# Patient Record
Sex: MALE | Race: WHITE
[De-identification: names, ages, dates, MRNs, and addresses within clinical notes are randomized per-mention and may not be internally consistent; named-entity substitution may affect disease eponyms.]

---

## 2019-01-17 ENCOUNTER — HOSPITAL ENCOUNTER (INPATIENT)
Dept: HOSPITAL 96 - M.ERS | Age: 44
LOS: 4 days | Discharge: HOME | DRG: 388 | End: 2019-01-21
Attending: INTERNAL MEDICINE | Admitting: INTERNAL MEDICINE
Payer: MEDICARE

## 2019-01-17 VITALS — SYSTOLIC BLOOD PRESSURE: 144 MMHG | DIASTOLIC BLOOD PRESSURE: 96 MMHG

## 2019-01-17 VITALS — DIASTOLIC BLOOD PRESSURE: 94 MMHG | SYSTOLIC BLOOD PRESSURE: 140 MMHG

## 2019-01-17 VITALS — WEIGHT: 231 LBS | BODY MASS INDEX: 34.21 KG/M2 | HEIGHT: 69.02 IN

## 2019-01-17 VITALS — DIASTOLIC BLOOD PRESSURE: 96 MMHG | SYSTOLIC BLOOD PRESSURE: 135 MMHG

## 2019-01-17 VITALS — DIASTOLIC BLOOD PRESSURE: 91 MMHG | SYSTOLIC BLOOD PRESSURE: 141 MMHG

## 2019-01-17 VITALS — DIASTOLIC BLOOD PRESSURE: 85 MMHG | SYSTOLIC BLOOD PRESSURE: 138 MMHG

## 2019-01-17 VITALS — SYSTOLIC BLOOD PRESSURE: 142 MMHG | DIASTOLIC BLOOD PRESSURE: 100 MMHG

## 2019-01-17 DIAGNOSIS — H35.52: ICD-10-CM

## 2019-01-17 DIAGNOSIS — Z90.49: ICD-10-CM

## 2019-01-17 DIAGNOSIS — H91.90: ICD-10-CM

## 2019-01-17 DIAGNOSIS — Q16.5: ICD-10-CM

## 2019-01-17 DIAGNOSIS — Z82.49: ICD-10-CM

## 2019-01-17 DIAGNOSIS — Z88.5: ICD-10-CM

## 2019-01-17 DIAGNOSIS — R65.11: ICD-10-CM

## 2019-01-17 DIAGNOSIS — N18.1: ICD-10-CM

## 2019-01-17 DIAGNOSIS — N17.9: ICD-10-CM

## 2019-01-17 DIAGNOSIS — Z79.899: ICD-10-CM

## 2019-01-17 DIAGNOSIS — K56.600: Primary | ICD-10-CM

## 2019-01-17 DIAGNOSIS — R00.1: ICD-10-CM

## 2019-01-17 DIAGNOSIS — Q87.89: ICD-10-CM

## 2019-01-17 DIAGNOSIS — E83.42: ICD-10-CM

## 2019-01-17 DIAGNOSIS — E87.6: ICD-10-CM

## 2019-01-17 DIAGNOSIS — H54.8: ICD-10-CM

## 2019-01-17 LAB
ABSOLUTE MONOCYTES: 0.4 THOU/UL (ref 0–1.2)
ALBUMIN SERPL-MCNC: 4.2 G/DL (ref 3.4–5)
ALP SERPL-CCNC: 122 U/L (ref 46–116)
ALT SERPL-CCNC: 46 U/L (ref 30–65)
ANION GAP SERPL CALC-SCNC: 10 MMOL/L (ref 7–16)
AST SERPL-CCNC: 22 U/L (ref 15–37)
BILIRUB SERPL-MCNC: 0.8 MG/DL
BILIRUB UR-MCNC: NEGATIVE MG/DL
BUN SERPL-MCNC: 13 MG/DL (ref 7–18)
CALCIUM SERPL-MCNC: 9 MG/DL (ref 8.5–10.1)
CHLORIDE SERPL-SCNC: 108 MMOL/L (ref 98–107)
CO2 SERPL-SCNC: 24 MMOL/L (ref 21–32)
COLOR UR: YELLOW
CREAT SERPL-MCNC: 1.3 MG/DL (ref 0.6–1.3)
GLUCOSE SERPL-MCNC: 153 MG/DL (ref 70–99)
GRANULOCYTES NFR BLD MANUAL: 90 %
HCT VFR BLD CALC: 49.8 % (ref 42–52)
HGB BLD-MCNC: 17.2 GM/DL (ref 14–18)
KETONES UR STRIP-MCNC: (no result) MG/DL
LIPASE: 88 U/L (ref 73–393)
LYMPHOCYTES # BLD: 0.7 THOU/UL (ref 0.8–5.3)
LYMPHOCYTES NFR BLD AUTO: 5 %
MCH RBC QN AUTO: 32.6 PG (ref 26–34)
MCHC RBC AUTO-ENTMCNC: 34.5 G/DL (ref 28–37)
MCV RBC: 94.6 FL (ref 80–100)
MONOCYTES NFR BLD: 3 %
MPV: 7.8 FL. (ref 7.2–11.1)
NEUTROPHILS # BLD: 12.9 THOU/UL (ref 1.6–8.1)
NEUTS BAND NFR BLD: 2 %
NUCLEATED RBCS: 0 /100WBC
PLATELET # BLD EST: ADEQUATE 10*3/UL
PLATELET COUNT*: 224 THOU/UL (ref 150–400)
POTASSIUM SERPL-SCNC: 3.8 MMOL/L (ref 3.5–5.1)
PROT SERPL-MCNC: 7.8 G/DL (ref 6.4–8.2)
PROT UR QL STRIP: (no result)
RBC # BLD AUTO: 5.27 MIL/UL (ref 4.5–6)
RBC # UR STRIP: (no result) /UL
RBC MORPH BLD: NORMAL
RDW-CV: 13.9 % (ref 10.5–14.5)
SODIUM SERPL-SCNC: 142 MMOL/L (ref 136–145)
SP GR UR STRIP: 1.02 (ref 1–1.03)
URINE CLARITY: CLEAR
URINE GLUCOSE-RANDOM: NEGATIVE
URINE LEUKOCYTES-REFLEX: NEGATIVE
URINE NITRITE-REFLEX: NEGATIVE
UROBILINOGEN UR STRIP-ACNC: 0.2 E.U./DL (ref 0.2–1)
WBC # BLD AUTO: 14 THOU/UL (ref 4–11)

## 2019-01-17 NOTE — NUR
SPOKE WITH PT.AND FATHER. PT.UP IN CHAIR. KEPT DOZING OFF DURING CONVERSATION.
PT.IS BOTH DEAF AND BLIND. HE CAN HEAR A LITTLE OUT OF HIS R EAR AND WEARS A
HEARING AID. HE LIVES ALONE. FATHER SAID HE HAS SINCE AGE 18. PT.  BEGAN TO
LOSE SIGHT AT AGE 8 AND WAS BORN HEARING IMPAIRED. HE IS VERY INDEPENDENT.
DOES BASICALLY EVERYHTING BUT DRIVE. HIS PARENTS TAKE HIM SHOPPING. HE HAS AN
I PHONE THAT IS BLUE TOOTH COMPATIBLE WITH A DEVICE HE WEARS ON HIS WAIST.
DARA COMES ACROSS IT TO READ HIS MESSAGES AND IT HAS KEY BOARD FOR HIM TO
TYPE HIS RESPONSE. HE GIVES MOTIVATIONAL TALKS ABOUT HIS DISABILITIES AND HOW
HE CAN DO ANYTHING SO OTHERS CAN SO TOO IF THEY SET THEIR MINDS TO IT. HE
CALLS THE BAD THINGS THAT HAVE HAPPENED TO HIM IN LIFE- SHARKS. HIS TALKS ARE
CALLED SWIMMING WITH THE SHARKS. HE DOES SIGN LANGUAGE AND HAS AN 
TO DO THE TALKNG FOR HIS TALKS.  HE HAS RECENTLY BECAME THE FIRST PERSON
QUALIFIED FOR ASSISTANCE BEING BLIND AND DEAF. BEFORE IF YOU WERE BLIND AND
DEAF, YOU HAD TO HAVE A MENTAL DISABLITY ALSO. HE SHOULD BE ABLE TO GET
ASSISTANCE OF AN AIDE TO ASSIST HIM WITH ERRANDS,APPTS.,ETC. FATHER COULD NOT
THINK OF ANYTHING CM COULD PROVIDE TO ASSIST PT. HE SAID PTS MOM WILL BE
BRINGING IN HIS PHONE TODAY. CM WILL FOLLOW.

## 2019-01-17 NOTE — NUR
PATIENT ADMITTED TO ROOM 108 ON UNIT AT APPROXIMATELY 0515. PATIENTS MOTHER AT
BEDSIDE HELPING TO ANSWER QUESTIONS FOR ADMISSION. PATIENT HAS EXPRESSIVE
APHASIA AND HAS DIFFICULTY WITH COMMUNICATION. PATIENTS MOTHER STATED THAT
PATIENT USES BRAIL AND TACTILE SIGN LANGUAGE TO COMMUNICATE. PATIENT AND
MOTHER WERE ORIENTED TO ROOM AND STAFF. FALL EDUCATION GIVEN AND FALL
AGREEMENT SIGNED. ADMISSION AND ASSESSMENT AS CHARTED. VSS ON RA. PAIN
CONTROLLED AT THIS TIME D/T RECEIVING MEDICATION IN ER. PATIENT HAS HAD SOME
SLIGHT NAUSEA BUT NO EMESIS. PATIENT URINATING USING URINAL AND ALSO ASSISTED
X 1 TO THE BATHROOM. PATIENT IS NPO AT THIS TIME AND SURGERY HAS BEEN
CONSULTED IN ER. IV IN RIGHT AC-NS @ 100ML/HR. COTTON BALL TAPED TO CALL LIGHT
BUTTON TO ASSIST WHEN NEEDING TO USE CALL LIGHT. FALL PRECAUTIONS IN PLACE AND
HOURLY ROUNDS MADE. WILL CONTINUE WITH PLAN OF CARE AND NURSING TO MONITOR.

## 2019-01-17 NOTE — NUR
PATIENT REMAINED ALERT AND ORIENTED X'S 4. VITAL SIGNS AND SPO2 STABLE. IV
CLEAN, FLUIDS INFUSING. PAIN WELL CONTROLLED WITH PAIN MEDS. HAD BOUTS OF
NAUSEA, ZOFRAN DID NOT WORKING, GOT IV PHENERGRAN, NAUSEA SUBSIDED, HAS NOT
HAD A BOUT OF NAUSEA SINCE PHENERGREN GIVEN. HAVING HICCUPS. VOIDED WITHOUT
ISSUE. COMPLETED HOURLY ROUDNING. CALL LIGHT WITHIN REACH.

## 2019-01-18 VITALS — SYSTOLIC BLOOD PRESSURE: 116 MMHG | DIASTOLIC BLOOD PRESSURE: 61 MMHG

## 2019-01-18 VITALS — DIASTOLIC BLOOD PRESSURE: 82 MMHG | SYSTOLIC BLOOD PRESSURE: 127 MMHG

## 2019-01-18 VITALS — SYSTOLIC BLOOD PRESSURE: 120 MMHG | DIASTOLIC BLOOD PRESSURE: 80 MMHG

## 2019-01-18 VITALS — SYSTOLIC BLOOD PRESSURE: 151 MMHG | DIASTOLIC BLOOD PRESSURE: 103 MMHG

## 2019-01-18 VITALS — DIASTOLIC BLOOD PRESSURE: 70 MMHG | SYSTOLIC BLOOD PRESSURE: 115 MMHG

## 2019-01-18 LAB
ANION GAP SERPL CALC-SCNC: 6 MMOL/L (ref 7–16)
BUN SERPL-MCNC: 17 MG/DL (ref 7–18)
CALCIUM SERPL-MCNC: 8.5 MG/DL (ref 8.5–10.1)
CHLORIDE SERPL-SCNC: 108 MMOL/L (ref 98–107)
CO2 SERPL-SCNC: 28 MMOL/L (ref 21–32)
CREAT SERPL-MCNC: 1.3 MG/DL (ref 0.6–1.3)
GLUCOSE SERPL-MCNC: 138 MG/DL (ref 70–99)
MAGNESIUM SERPL-MCNC: 1.7 MG/DL (ref 1.8–2.4)
PHOSPHATE SERPL-MCNC: 2.9 MG/DL (ref 2.5–4.9)
POTASSIUM SERPL-SCNC: 3.5 MMOL/L (ref 3.5–5.1)
SODIUM SERPL-SCNC: 142 MMOL/L (ref 136–145)

## 2019-01-18 PROCEDURE — 0D9670Z DRAINAGE OF STOMACH WITH DRAINAGE DEVICE, VIA NATURAL OR ARTIFICIAL OPENING: ICD-10-PCS | Performed by: RADIOLOGY

## 2019-01-18 NOTE — NUR
Oriented x 4 but blind and deaf. He has abdominal pain that he rates a 10 lexus
times. His mom is at the bedside and helps with his communication. He does
have some hearing in his right ear and he wears a hearing aide. He's had
fentanyl 100 mcg x 3 this shift for the abdominal pain. His abdomen is
distended and is hyperactive/tympanic. He is passing gas this shift and has
voided large amounts. Mom said he had diarrhea on Wednesday. He states pain is
more on his rt side and his ribs. BP has been elevated r/t pain. Roomair sat
has been 91-94%. Nausea x 1. He is sleeping in recliner this shift.

## 2019-01-18 NOTE — NUR
ASSUMED CARE OF PATIENT AT APPROX 0730. ALERT AND ORIENTED X4. ASSESSMENT
COMPLETED AND AS CHARTED. VSS ON ROOM AIR. PATIENT HAD COMPLAINTS OF ABDOMINAL
PAIN EARLY IN THE SHIFT, GAVE IV PAIN MEDICATION WITH PARTIAL RELIEF. NG TUBE
ORDERED AND PLACED, SET ON LOW INTERMITTENT SUCTION WITH 625 OUTPUT BY END OF
SHIFT. PATIENT STATES THAT HIS ABDOMINAL PAIN IS BETTER NOW. FLUIDS INFUSED AS
ORDERED. MAG INFUSING AS ORDERED. FALL PRECAUTIONS IN PLACE. HOURLY ROUNDS
COMPLETED. NURSING WILL CONTINUE TO MONITOR.

## 2019-01-19 VITALS — SYSTOLIC BLOOD PRESSURE: 134 MMHG | DIASTOLIC BLOOD PRESSURE: 82 MMHG

## 2019-01-19 VITALS — SYSTOLIC BLOOD PRESSURE: 123 MMHG | DIASTOLIC BLOOD PRESSURE: 84 MMHG

## 2019-01-19 LAB
ABSOLUTE BASOPHILS: 0.1 THOU/UL (ref 0–0.2)
ABSOLUTE EOSINOPHILS: 0.1 THOU/UL (ref 0–0.7)
ABSOLUTE MONOCYTES: 1 THOU/UL (ref 0–1.2)
ANION GAP SERPL CALC-SCNC: 8 MMOL/L (ref 7–16)
BASOPHILS NFR BLD AUTO: 0.7 %
BUN SERPL-MCNC: 16 MG/DL (ref 7–18)
CALCIUM SERPL-MCNC: 8.7 MG/DL (ref 8.5–10.1)
CHLORIDE SERPL-SCNC: 110 MMOL/L (ref 98–107)
CO2 SERPL-SCNC: 30 MMOL/L (ref 21–32)
CREAT SERPL-MCNC: 1.4 MG/DL (ref 0.6–1.3)
EOSINOPHIL NFR BLD: 0.7 %
GLUCOSE SERPL-MCNC: 117 MG/DL (ref 70–99)
GRANULOCYTES NFR BLD MANUAL: 78.2 %
HCT VFR BLD CALC: 49.9 % (ref 42–52)
HGB BLD-MCNC: 16.9 GM/DL (ref 14–18)
LYMPHOCYTES # BLD: 1.8 THOU/UL (ref 0.8–5.3)
LYMPHOCYTES NFR BLD AUTO: 13.1 %
MCH RBC QN AUTO: 32.2 PG (ref 26–34)
MCHC RBC AUTO-ENTMCNC: 33.8 G/DL (ref 28–37)
MCV RBC: 95.1 FL (ref 80–100)
MONOCYTES NFR BLD: 7.3 %
MPV: 8.2 FL. (ref 7.2–11.1)
NEUTROPHILS # BLD: 10.8 THOU/UL (ref 1.6–8.1)
NUCLEATED RBCS: 0 /100WBC
PLATELET COUNT*: 184 THOU/UL (ref 150–400)
POTASSIUM SERPL-SCNC: 3.5 MMOL/L (ref 3.5–5.1)
RBC # BLD AUTO: 5.24 MIL/UL (ref 4.5–6)
RDW-CV: 14 % (ref 10.5–14.5)
SODIUM SERPL-SCNC: 148 MMOL/L (ref 136–145)
WBC # BLD AUTO: 13.8 THOU/UL (ref 4–11)

## 2019-01-19 NOTE — NUR
PATIENT REMAINED ALERT AND ORIENTED X'S 4. VITAL SIGNS AND SPO2 STABLE. IV
CLEAN, FLUIDS INFUSING. NG CLAMPED, NO NAUSEA OR VOMITING. TOLERATED DIET.
PATIENT GIVEN ENEMA, VERY SMALL AMOUNT OF STOOL OUT. ABDOMEN MUCH
SOFTER AND LESS DISTENEDED THAN PREVIOUSLY. PATIENT GIVEN BED BATH. PAIN WELL
CONTROLLED WITH PAIN MEDS. ALL MORNING MEDS SKIPPED DUE TO PATIENT BEING NPO.
COMPLETED HOURLY ROUNDING. CALL LIGHT WITHIN REACH. WILL CONTINUE TO MONITOR.

## 2019-01-19 NOTE — NUR
ASSUMED PATIENT CARE AT 1900.  PATIENT ALERT AND ORIENTED TIMES FOUR.  NG IN
PLACE TO CONTINUOUS SUCTION.  1400 OUT SINCE TUBE PUT IN.  PATIENT HAD A SMALL
SEMI-FORMED BM THIS AM.  IV REMAINS PATENT.  MINOR DISCOMFORT NOTED IN
ABDOMINAL AREA.  HEATING PAD IN PLACE.  MOTHER AT BEDSIDE THROUGH THE NIGHT.
HELPS STAFF WITH INTERPRETING PATIENTS SPEECH AS HE IS MOSTLY DEAF AND DOES
NOT ALWAYS FORM WORDS PROPERLY.  HOURLY ROUNDING AND RN ASSESSMENT COMPLETED
AS DOCUMENTED

## 2019-01-20 VITALS — SYSTOLIC BLOOD PRESSURE: 124 MMHG | DIASTOLIC BLOOD PRESSURE: 74 MMHG

## 2019-01-20 VITALS — SYSTOLIC BLOOD PRESSURE: 143 MMHG | DIASTOLIC BLOOD PRESSURE: 72 MMHG

## 2019-01-20 LAB
ANION GAP SERPL CALC-SCNC: 5 MMOL/L (ref 7–16)
BUN SERPL-MCNC: 14 MG/DL (ref 7–18)
CALCIUM SERPL-MCNC: 7.7 MG/DL (ref 8.5–10.1)
CHLORIDE SERPL-SCNC: 111 MMOL/L (ref 98–107)
CO2 SERPL-SCNC: 30 MMOL/L (ref 21–32)
CREAT SERPL-MCNC: 1.2 MG/DL (ref 0.6–1.3)
GLUCOSE SERPL-MCNC: 99 MG/DL (ref 70–99)
HCT VFR BLD CALC: 42.5 % (ref 42–52)
HGB BLD-MCNC: 14.5 GM/DL (ref 14–18)
MCH RBC QN AUTO: 32.7 PG (ref 26–34)
MCHC RBC AUTO-ENTMCNC: 34.1 G/DL (ref 28–37)
MCV RBC: 95.8 FL (ref 80–100)
MPV: 8.1 FL. (ref 7.2–11.1)
PLATELET COUNT*: 150 THOU/UL (ref 150–400)
POTASSIUM SERPL-SCNC: 3.2 MMOL/L (ref 3.5–5.1)
RBC # BLD AUTO: 4.44 MIL/UL (ref 4.5–6)
RDW-CV: 13.9 % (ref 10.5–14.5)
SODIUM SERPL-SCNC: 146 MMOL/L (ref 136–145)
WBC # BLD AUTO: 8.9 THOU/UL (ref 4–11)

## 2019-01-20 NOTE — NUR
ASSUMED CARE OF PATEINT AT APPROX 1930. ALERT AND OREINTED X4. ASSESSMENT
COMPLETED AND AS CHARTED. VSS ON ROOM AIR. NO COMPLAINTS OF PAIN, NAUSEA, OR
SOA. NG REMAINS IN PLACE AND CLAMPED. FLUIDS INFUSED AS ORDERED. TOLERATING
CLEAR LIQUID DIET. UP WIHT SBA TO BEDSIDE COMMODE. SLEPT COMFORTABLY
THROUGHOUT THE NIGHT. FALL PRECAUTIONS IN PLACE. CALL LIGHT WITHIN REACH AND
USES APPROPRIATELY. FAMILY AT BEDSIDE THROUGHOUT THE NIGHT. HOURLY ROUNDS
COMPLETED. NURSING WILL CONTINUE TO MONITOR.

## 2019-01-20 NOTE — NUR
PATIENT REMAINED ALERT AND ORIENTED X'S 4. VITAL SIGNS AND SPO2 STABLE. NG
TUBE REMOVED. TOLERATED DIET, NO NAUSEA AND VOMITING. PATIENT DRINKING LOTS OF
WATER WITHOUT ISSUE. VOIDED AND PASSED BM. K+ WAS LOW, GAVE REPLACEMENT, K+
NOW AT 3.4. GAVE ONE MORE ROUND OF REPLACEMENT BUT HAS NOT HAD A LAB REDRAW
YET. PATIENT HAS DENIED PAIN THROUGHOUT SHIFT. COMPLETED HOURLY ROUNDING. CALL
LIGHT WITHIN REACH. WILL CONTINUE TO MONITOR.

## 2019-01-21 VITALS — DIASTOLIC BLOOD PRESSURE: 84 MMHG | SYSTOLIC BLOOD PRESSURE: 136 MMHG

## 2019-01-21 VITALS — SYSTOLIC BLOOD PRESSURE: 136 MMHG | DIASTOLIC BLOOD PRESSURE: 84 MMHG

## 2019-01-21 NOTE — NUR
PT WAS SEEN BY CARDIOLOGY AND APPROVED DISCHARGE. DISCHARGE INSTRUCTIONS GIVEN
TO PATIENT AND PARENTS AT BEDSIDE. IV REMOVED. PT NOTED TO HAVE BOWEL
MOVEMENT TODAY. PT LEFT BY WHEEL CHAIR WITH PERSONAL BELONGINGS TO LEAVE WITH
PARENTS BY PRIVATE CAR @ 0705.

## 2019-01-21 NOTE — NUR
PT ALERT AND ORIENTED. MOTHER @ BEDSIDE. HR BRADYCARDIC @ 42.  COZAAR,
METOPROLOL, AND LOSARTIN HELD. PHYSICIAN NOTIFIED. RECEIVED ORDERS TO GIVE
LOSARTIN. DENIES NAUSEA AND PAIN. NURSING TO CONTINUE TO MONITOR.

## 2019-01-21 NOTE — EKG
[place] [address]
Phone:  [phone]                     ELECTROCARDIOGRAM REPORT      
_______________________________________________________________________________
 
Name:       VINCE BOOKER              Room:           [address]    ADM IN  
.R.#:  [id number]      Account #:      [id number]  
Admission:  19     Attend Phys:    Kiki Forde MD 
Discharge:               Date of Birth:  [birth year]  
         Report #: [id number]
    [id number]
_______________________________________________________________________________
THIS REPORT FOR:  //name//                      
 
                          [facility]
                                       
Test Date:    2019               Test Time:    09:34:57
Pat Name:     VINCE BOOKER          Department:   
Patient ID:   [id number]            Room:         [address]
Gender:       M                        Technician:   
:          1975               Requested By: Bella Villafana
Order Number: [id number]    Celina MD:   Tone Chairez
                                 Measurements
Intervals                              Axis          
Rate:         39                       P:            -2
SC:           152                      QRS:          74
QRSD:         96                       T:            85
QT:           479                                    
QTc:          386                                    
                           Interpretive Statements
Sinus bradycardia
Low voltage, extremity leads
No previous ECG available for comparison
 
Electronically Signed On 2019 15:47:23 CST by Tone Chairez
[email or web address]
 
 
 
 
 
 
 
 
 
 
 
 
 
 
 
 
 
 
 
  <ELECTRONICALLY SIGNED>
                                           By: Tone Chairez MD, [facility]      
  19     1547
D: 19 0934   _____________________________________
T: 19   Tone Chairez MD, FACC        /EPI

## 2019-01-21 NOTE — NUR
ASSUMED CARE OF PATIENT AT APPROX 1930. ALERT AND ORIENTED X4. ASSESSMENT
COMPLETED AND AS CHARTED. NO COMPLAINTS OF PAIN, NAUSEA, OR SOA. PATEINT
TOLERATING LIQUID DIET, HAVING STOOLS AND PASSING GAS. PATIENT UP SBA TO
COMMODE. OBSERVED SLEEPING COMFORTABLY IN THE CHAIR ON HOURLY ROUNDS. CALL
IGHT WITHIN REACH AND FAMILY ASSISTS WITH CALLING FOR NEEDS. FALL PRECAUTIONS
IN PLACE. NURSING WILL CONTINUE TO MONITOR.

## 2019-01-22 NOTE — CON
[address]  [address]                    CONSULTATION                  
_______________________________________________________________________________
 
Name:       VINCE BOOKER              Room:           [address] [place]  
M.R.#:  [id number]      Account #:      [id number]  
Admission:  01/17/19     Attend Phys:    Kiki Forde MD 
Discharge:  01/21/19     Date of Birth:  [birth year]  
         Report #: [id number]
                                                                     [id number]  
_______________________________________________________________________________
THIS REPORT FOR:  //name//                      
 
CC: UMM Forde
    Physician staff
 
DATE OF SERVICE:  01/21/2019
 
 
HISTORY OF PRESENT ILLNESS:  The patient is a 43-year-old white male who I was
asked to see in the hospital today after he was noted to be bradycardic.  The
history is obtained from the chart as well as the patient's father, who is
present.  The patient apparently was born deaf, but does speak.  He is able to
ambulate.  He actually wrestled in high school.  He has been blind since he was
8 years old.  He has multiple medical issues.  He is able to ambulate with a
cane.  He lives by himself.  He is actually a .  He
presented here to the Emergency Room 5 days ago with nausea and vomiting and
abdominal pain.  He was felt to be having evidence of an acute abdomen.  He was
found to have evidence of partial small-bowel obstruction.  The symptoms
resolved.  He is now being advanced to full liquids.  He was noted to be
bradycardic.  Cardiology consultation was requested.  According to the father,
there has been no history of heart problems.  He denies being told he had a
heart murmur or slow heart rate in the past.  He has had no recent
lightheadedness, shortness of breath, syncope.
 
PAST MEDICAL HISTORY:  He has had previous cholecystectomy, back surgery,
cataract extraction.  Apparently, in [place] in the past he had pneumonia and
had a lung biopsy for possible lung cancer that turned out not to be cancer.  He
does have a history of high blood pressure.  No history of diabetes.
 
MEDICATIONS ON ADMISSION:  Included metoprolol, losartan, aspirin.
 
ALLERGIES:  HE HAS AN INTOLERANCE TO MORPHINE.
 
FAMILY HISTORY:  His father had coronary artery bypass surgery.
 
SOCIAL HISTORY:  He is single.  Lives in [place], [place].  No smoking or
alcohol abuse.
 
REVIEW OF SYSTEMS:  He has no history of stroke, asthma, peptic ulcer disease,
liver disease.  He had chronic kidney disease, no cancer.  No psychiatric
illness.  No chronic skin condition.
 
PHYSICAL EXAMINATION:
GENERAL:  Revealed a young white male lying in bed.  He appeared in no distress.
VITAL SIGNS:  Currently, has a blood pressure 130/80, pulse is 50.  He is
 
 
 
[place] [address]                    CONSULTATION                  
_______________________________________________________________________________
 
Name:       VINCE BOOKRE              Room:           [address]#:  [id number]      Account #:      [id number]  
Admission:  01/17/19     Attend Phys:    Kiki Forde MD 
Discharge:  01/21/19     Date of Birth:  [birth year]  
         Report #: [id number]
                                                                     [id number]  
_______________________________________________________________________________
afebrile.
HEENT:  He is anicteric.  Conjunctivae pink.  Mucous membranes moist.
NECK:  Veins do not appear distended.  No carotid bruits.  Neck supple.
CHEST:  Clear to auscultation.
CARDIAC:  Regular bradycardia, no significant murmur.
ABDOMEN:  Soft.
EXTREMITIES:  No edema.
SKIN:  Warm and dry.
NEUROLOGIC:  Nonfocal.
 
LABORATORY DATA:  His ECG on admission from 01/21/2019 showed severe sinus
bradycardia at 40 beats per minute, low voltage, no significant ST or T-wave
changes.  His workup so far, he actually had lab work, sodium 146, creatinine
1.2, potassium 3.4, on admission potassium was 3.7.  Liver function studies: 
Alkaline phosphatase 122.  White blood cell count 8.9, hemoglobin 14.5.
 
IMPRESSION AND RECOMMENDATIONS:
1.  Sinus bradycardia.  Asymptomatic.  I would not treat.  At this time, I would
discontinue metoprolol and continue losartan by itself for hypertension.  I
would consider checking thyroid function studies.
2.  Legally blind.
3.  Deaf.
4.  Partial small-bowel obstruction.  Appears to have resolved.
 
 
 
 
 
 
 
 
 
 
 
 
 
 
 
 
 
 
 
 
 
<ELECTRONICALLY SIGNED>
                                        By:  Tone Chairez MD, FACC      
01/22/19     0809
D: 01/21/19 1444_______________________________________
T: 01/21/19 1754Davigarrett Chairez MD, FACC         /nt

## 2020-10-04 ENCOUNTER — HOSPITAL ENCOUNTER (INPATIENT)
Dept: HOSPITAL 96 - M.ERS | Age: 45
LOS: 3 days | Discharge: HOME | DRG: 871 | End: 2020-10-07
Attending: INTERNAL MEDICINE | Admitting: INTERNAL MEDICINE
Payer: MEDICARE

## 2020-10-04 VITALS — SYSTOLIC BLOOD PRESSURE: 146 MMHG | DIASTOLIC BLOOD PRESSURE: 91 MMHG

## 2020-10-04 VITALS — SYSTOLIC BLOOD PRESSURE: 145 MMHG | DIASTOLIC BLOOD PRESSURE: 88 MMHG

## 2020-10-04 VITALS — DIASTOLIC BLOOD PRESSURE: 97 MMHG | SYSTOLIC BLOOD PRESSURE: 141 MMHG

## 2020-10-04 VITALS — BODY MASS INDEX: 33.92 KG/M2 | HEIGHT: 69.02 IN | WEIGHT: 229 LBS

## 2020-10-04 VITALS — DIASTOLIC BLOOD PRESSURE: 97 MMHG | SYSTOLIC BLOOD PRESSURE: 142 MMHG

## 2020-10-04 DIAGNOSIS — K21.9: ICD-10-CM

## 2020-10-04 DIAGNOSIS — K52.9: ICD-10-CM

## 2020-10-04 DIAGNOSIS — Z28.21: ICD-10-CM

## 2020-10-04 DIAGNOSIS — Z20.828: ICD-10-CM

## 2020-10-04 DIAGNOSIS — I12.9: ICD-10-CM

## 2020-10-04 DIAGNOSIS — Q16.5: ICD-10-CM

## 2020-10-04 DIAGNOSIS — A41.9: Primary | ICD-10-CM

## 2020-10-04 DIAGNOSIS — N18.30: ICD-10-CM

## 2020-10-04 DIAGNOSIS — Z90.49: ICD-10-CM

## 2020-10-04 DIAGNOSIS — K56.609: ICD-10-CM

## 2020-10-04 DIAGNOSIS — N17.0: ICD-10-CM

## 2020-10-04 DIAGNOSIS — Z88.5: ICD-10-CM

## 2020-10-04 DIAGNOSIS — Z88.8: ICD-10-CM

## 2020-10-04 DIAGNOSIS — M81.0: ICD-10-CM

## 2020-10-04 DIAGNOSIS — Q87.89: ICD-10-CM

## 2020-10-04 DIAGNOSIS — Z79.899: ICD-10-CM

## 2020-10-04 DIAGNOSIS — M10.9: ICD-10-CM

## 2020-10-04 DIAGNOSIS — E86.0: ICD-10-CM

## 2020-10-04 LAB
ABSOLUTE MONOCYTES: 0.3 THOU/UL (ref 0–1.2)
ALBUMIN SERPL-MCNC: 4.4 G/DL (ref 3.4–5)
ALP SERPL-CCNC: 159 U/L (ref 46–116)
ALT SERPL-CCNC: 41 U/L (ref 30–65)
ANION GAP SERPL CALC-SCNC: 8 MMOL/L (ref 7–16)
AST SERPL-CCNC: 31 U/L (ref 15–37)
BILIRUB SERPL-MCNC: 0.8 MG/DL
BILIRUB UR-MCNC: NEGATIVE MG/DL
BUN SERPL-MCNC: 14 MG/DL (ref 7–18)
CALCIUM SERPL-MCNC: 9.4 MG/DL (ref 8.5–10.1)
CHLORIDE SERPL-SCNC: 104 MMOL/L (ref 98–107)
CO2 SERPL-SCNC: 29 MMOL/L (ref 21–32)
COLOR UR: YELLOW
CREAT SERPL-MCNC: 1.4 MG/DL (ref 0.6–1.3)
GLUCOSE SERPL-MCNC: 127 MG/DL (ref 70–99)
GRANULOCYTES NFR BLD MANUAL: 89 %
HCT VFR BLD CALC: 53.9 % (ref 42–52)
HGB BLD-MCNC: 18.5 GM/DL (ref 14–18)
KETONES UR STRIP-MCNC: (no result) MG/DL
LIPASE: 93 U/L (ref 73–393)
LYMPHOCYTES # BLD: 0.9 THOU/UL (ref 0.8–5.3)
LYMPHOCYTES NFR BLD AUTO: 5 %
MCH RBC QN AUTO: 31.9 PG (ref 26–34)
MCHC RBC AUTO-ENTMCNC: 34.3 G/DL (ref 28–37)
MCV RBC: 92.9 FL (ref 80–100)
MONOCYTES NFR BLD: 2 %
MPV: 8.6 FL. (ref 7.2–11.1)
NEUTROPHILS # BLD: 16.1 THOU/UL (ref 1.6–8.1)
NEUTS BAND NFR BLD: 4 %
NUCLEATED RBCS: 0 /100WBC
PLATELET # BLD EST: ADEQUATE 10*3/UL
PLATELET COUNT*: 213 THOU/UL (ref 150–400)
POTASSIUM SERPL-SCNC: 4.6 MMOL/L (ref 3.5–5.1)
PROT SERPL-MCNC: 8.2 G/DL (ref 6.4–8.2)
PROT UR QL STRIP: (no result)
RBC # BLD AUTO: 5.81 MIL/UL (ref 4.5–6)
RBC # UR STRIP: (no result) /UL
RBC MORPH BLD: NORMAL
RDW-CV: 14.3 % (ref 10.5–14.5)
SODIUM SERPL-SCNC: 141 MMOL/L (ref 136–145)
SP GR UR STRIP: 1.02 (ref 1–1.03)
URINE CLARITY: CLEAR
URINE GLUCOSE-RANDOM: NEGATIVE
URINE LEUKOCYTES-REFLEX: NEGATIVE
URINE NITRITE-REFLEX: NEGATIVE
UROBILINOGEN UR STRIP-ACNC: 0.2 E.U./DL (ref 0.2–1)
WBC # BLD AUTO: 17.3 THOU/UL (ref 4–11)

## 2020-10-05 VITALS — SYSTOLIC BLOOD PRESSURE: 130 MMHG | DIASTOLIC BLOOD PRESSURE: 89 MMHG

## 2020-10-05 VITALS — DIASTOLIC BLOOD PRESSURE: 84 MMHG | SYSTOLIC BLOOD PRESSURE: 143 MMHG

## 2020-10-05 VITALS — SYSTOLIC BLOOD PRESSURE: 131 MMHG | DIASTOLIC BLOOD PRESSURE: 76 MMHG

## 2020-10-05 VITALS — SYSTOLIC BLOOD PRESSURE: 127 MMHG | DIASTOLIC BLOOD PRESSURE: 83 MMHG

## 2020-10-05 VITALS — DIASTOLIC BLOOD PRESSURE: 90 MMHG | SYSTOLIC BLOOD PRESSURE: 146 MMHG

## 2020-10-05 VITALS — DIASTOLIC BLOOD PRESSURE: 98 MMHG | SYSTOLIC BLOOD PRESSURE: 149 MMHG

## 2020-10-05 NOTE — EKG
[place] [address]
Phone:  [phone]                     ELECTROCARDIOGRAM REPORT      
_______________________________________________________________________________
 
Name:         VINCE BOOKER             Room:          [address]    ADM IN 
.R.#:    [id number]     Account #:     [id number]  
Admission:    10/04/20    Attend Phys:   Woody Toledo
Discharge:                Date of Birth: [birth year]  
Date of Service: 10/04/20 1455  Report #:      [id number]
        [id number]
_______________________________________________________________________________
THIS REPORT FOR:  //name//                      
 
                         [facility] ED
                                       
Test Date:    2020-10-04               Test Time:    14:55:39
Pat Name:     VINCE BOOKER          Department:   
Patient ID:   [id number]            Room:         [facility]
Gender:       M                        Technician:   
:          1975               Requested By: Jayne Smith
Order Number: [id number] MD:   Tone Chairez
                                 Measurements
Intervals                              Axis          
Rate:         90                       P:            39
KS:           169                      QRS:          165
QRSD:         88                       T:            39
QT:           377                                    
QTc:          462                                    
                           Interpretive Statements
Sinus rhythm
Left posterior fascicular block
Low voltage, extremity leads
Consider anterior infarct
Baseline wander in lead(s) I,II,aVR,aVF
Compared to ECG 2019 09:34:57
 
 
Sinus bradycardia no longer present
Electronically Signed On 10-5-2020 9:37:02 CDT by Tone Chairez
[email or web address]
 
 
 
 
 
 
 
 
 
 
 
 
 
 
 
  <ELECTRONICALLY SIGNED>
                                           By: Tone Chairez MD, FACC      
  10/05/20     0937
D: 10/04/20 1455   _____________________________________
T: 10/04/20 1455   Tone Chairez MD, FAC        /EPI

## 2020-10-06 VITALS — SYSTOLIC BLOOD PRESSURE: 144 MMHG | DIASTOLIC BLOOD PRESSURE: 87 MMHG

## 2020-10-06 VITALS — DIASTOLIC BLOOD PRESSURE: 69 MMHG | SYSTOLIC BLOOD PRESSURE: 126 MMHG

## 2020-10-06 VITALS — DIASTOLIC BLOOD PRESSURE: 78 MMHG | SYSTOLIC BLOOD PRESSURE: 139 MMHG

## 2020-10-06 VITALS — SYSTOLIC BLOOD PRESSURE: 146 MMHG | DIASTOLIC BLOOD PRESSURE: 81 MMHG

## 2020-10-06 VITALS — DIASTOLIC BLOOD PRESSURE: 75 MMHG | SYSTOLIC BLOOD PRESSURE: 124 MMHG

## 2020-10-06 VITALS — SYSTOLIC BLOOD PRESSURE: 146 MMHG | DIASTOLIC BLOOD PRESSURE: 78 MMHG

## 2020-10-06 PROCEDURE — 0D9670Z DRAINAGE OF STOMACH WITH DRAINAGE DEVICE, VIA NATURAL OR ARTIFICIAL OPENING: ICD-10-PCS | Performed by: SURGERY

## 2020-10-07 VITALS — SYSTOLIC BLOOD PRESSURE: 141 MMHG | DIASTOLIC BLOOD PRESSURE: 78 MMHG

## 2020-10-07 VITALS — SYSTOLIC BLOOD PRESSURE: 140 MMHG | DIASTOLIC BLOOD PRESSURE: 81 MMHG

## 2020-10-07 VITALS — DIASTOLIC BLOOD PRESSURE: 87 MMHG | SYSTOLIC BLOOD PRESSURE: 151 MMHG

## 2020-10-07 VITALS — DIASTOLIC BLOOD PRESSURE: 77 MMHG | SYSTOLIC BLOOD PRESSURE: 135 MMHG

## 2020-10-07 VITALS — DIASTOLIC BLOOD PRESSURE: 82 MMHG | SYSTOLIC BLOOD PRESSURE: 162 MMHG

## 2020-10-07 VITALS — SYSTOLIC BLOOD PRESSURE: 162 MMHG | DIASTOLIC BLOOD PRESSURE: 82 MMHG

## 2020-10-07 LAB
ABSOLUTE BASOPHILS: 0 THOU/UL (ref 0–0.2)
ABSOLUTE EOSINOPHILS: 0.3 THOU/UL (ref 0–0.7)
ABSOLUTE MONOCYTES: 0.5 THOU/UL (ref 0–1.2)
ALBUMIN SERPL-MCNC: 3.3 G/DL (ref 3.4–5)
ALP SERPL-CCNC: 85 U/L (ref 46–116)
ALT SERPL-CCNC: 24 U/L (ref 30–65)
ANION GAP SERPL CALC-SCNC: 11 MMOL/L (ref 7–16)
AST SERPL-CCNC: 16 U/L (ref 15–37)
BASOPHILS NFR BLD AUTO: 0.6 %
BILIRUB SERPL-MCNC: 1 MG/DL
BUN SERPL-MCNC: 12 MG/DL (ref 7–18)
CALCIUM SERPL-MCNC: 8.4 MG/DL (ref 8.5–10.1)
CHLORIDE SERPL-SCNC: 105 MMOL/L (ref 98–107)
CO2 SERPL-SCNC: 25 MMOL/L (ref 21–32)
CREAT SERPL-MCNC: 1.3 MG/DL (ref 0.6–1.3)
EOSINOPHIL NFR BLD: 3.3 %
GLUCOSE SERPL-MCNC: 70 MG/DL (ref 70–99)
GRANULOCYTES NFR BLD MANUAL: 68.5 %
HCT VFR BLD CALC: 40.9 % (ref 42–52)
HGB BLD-MCNC: 14.4 GM/DL (ref 14–18)
LYMPHOCYTES # BLD: 1.8 THOU/UL (ref 0.8–5.3)
LYMPHOCYTES NFR BLD AUTO: 21.4 %
MAGNESIUM SERPL-MCNC: 1.7 MG/DL (ref 1.8–2.4)
MCH RBC QN AUTO: 32.1 PG (ref 26–34)
MCHC RBC AUTO-ENTMCNC: 35.2 G/DL (ref 28–37)
MCV RBC: 91.1 FL (ref 80–100)
MONOCYTES NFR BLD: 6.2 %
MPV: 8.5 FL. (ref 7.2–11.1)
NEUTROPHILS # BLD: 5.8 THOU/UL (ref 1.6–8.1)
NUCLEATED RBCS: 0 /100WBC
PLATELET COUNT*: 158 THOU/UL (ref 150–400)
POTASSIUM SERPL-SCNC: 3.2 MMOL/L (ref 3.5–5.1)
PROT SERPL-MCNC: 6.2 G/DL (ref 6.4–8.2)
RBC # BLD AUTO: 4.49 MIL/UL (ref 4.5–6)
RDW-CV: 14.1 % (ref 10.5–14.5)
SODIUM SERPL-SCNC: 141 MMOL/L (ref 136–145)
WBC # BLD AUTO: 8.4 THOU/UL (ref 4–11)